# Patient Record
Sex: FEMALE | Race: WHITE | URBAN - METROPOLITAN AREA
[De-identification: names, ages, dates, MRNs, and addresses within clinical notes are randomized per-mention and may not be internally consistent; named-entity substitution may affect disease eponyms.]

---

## 2018-02-13 ENCOUNTER — IMPORTED ENCOUNTER (OUTPATIENT)
Dept: URBAN - METROPOLITAN AREA CLINIC 50 | Facility: CLINIC | Age: 78
End: 2018-02-13

## 2018-04-17 ENCOUNTER — IMPORTED ENCOUNTER (OUTPATIENT)
Dept: URBAN - METROPOLITAN AREA CLINIC 50 | Facility: CLINIC | Age: 78
End: 2018-04-17

## 2018-04-18 ENCOUNTER — IMPORTED ENCOUNTER (OUTPATIENT)
Dept: URBAN - METROPOLITAN AREA CLINIC 50 | Facility: CLINIC | Age: 78
End: 2018-04-18

## 2018-04-25 ENCOUNTER — IMPORTED ENCOUNTER (OUTPATIENT)
Dept: URBAN - METROPOLITAN AREA CLINIC 50 | Facility: CLINIC | Age: 78
End: 2018-04-25

## 2018-05-08 ENCOUNTER — IMPORTED ENCOUNTER (OUTPATIENT)
Dept: URBAN - METROPOLITAN AREA CLINIC 50 | Facility: CLINIC | Age: 78
End: 2018-05-08

## 2018-05-25 ENCOUNTER — IMPORTED ENCOUNTER (OUTPATIENT)
Dept: URBAN - METROPOLITAN AREA CLINIC 50 | Facility: CLINIC | Age: 78
End: 2018-05-25

## 2018-07-23 ENCOUNTER — IMPORTED ENCOUNTER (OUTPATIENT)
Dept: URBAN - METROPOLITAN AREA CLINIC 50 | Facility: CLINIC | Age: 78
End: 2018-07-23

## 2018-07-24 ENCOUNTER — IMPORTED ENCOUNTER (OUTPATIENT)
Dept: URBAN - METROPOLITAN AREA CLINIC 50 | Facility: CLINIC | Age: 78
End: 2018-07-24

## 2018-11-05 ENCOUNTER — IMPORTED ENCOUNTER (OUTPATIENT)
Dept: URBAN - METROPOLITAN AREA CLINIC 50 | Facility: CLINIC | Age: 78
End: 2018-11-05

## 2019-08-26 ENCOUNTER — IMPORTED ENCOUNTER (OUTPATIENT)
Dept: URBAN - METROPOLITAN AREA CLINIC 50 | Facility: CLINIC | Age: 79
End: 2019-08-26

## 2019-09-24 ENCOUNTER — IMPORTED ENCOUNTER (OUTPATIENT)
Dept: URBAN - METROPOLITAN AREA CLINIC 50 | Facility: CLINIC | Age: 79
End: 2019-09-24

## 2019-11-27 ENCOUNTER — APPOINTMENT (RX ONLY)
Dept: URBAN - METROPOLITAN AREA CLINIC 56 | Facility: CLINIC | Age: 79
Setting detail: DERMATOLOGY
End: 2019-11-27

## 2019-11-27 DIAGNOSIS — D17 BENIGN LIPOMATOUS NEOPLASM: ICD-10-CM

## 2019-11-27 PROBLEM — D17.0 BENIGN LIPOMATOUS NEOPLASM OF SKIN AND SUBCUTANEOUS TISSUE OF HEAD, FACE AND NECK: Status: ACTIVE | Noted: 2019-11-27

## 2019-11-27 PROCEDURE — ? FULL BODY SKIN EXAM - DECLINED

## 2019-11-27 PROCEDURE — 99202 OFFICE O/P NEW SF 15 MIN: CPT

## 2019-11-27 PROCEDURE — ? COUNSELING

## 2019-11-27 ASSESSMENT — LOCATION DETAILED DESCRIPTION DERM: LOCATION DETAILED: RIGHT SUPERIOR FOREHEAD

## 2019-11-27 ASSESSMENT — LOCATION ZONE DERM: LOCATION ZONE: FACE

## 2019-11-27 ASSESSMENT — LOCATION SIMPLE DESCRIPTION DERM: LOCATION SIMPLE: RIGHT FOREHEAD

## 2019-11-27 NOTE — PROCEDURE: MIPS QUALITY
Quality 431: Preventive Care And Screening: Unhealthy Alcohol Use - Screening: Patient screened for unhealthy alcohol use using a single question and scores less than 2 times per year
Quality 226: Preventive Care And Screening: Tobacco Use: Screening And Cessation Intervention: Patient screened for tobacco use and is an ex/non-smoker
Quality 47: Advance Care Plan: Advance Care Planning discussed and documented in the medical record; patient did not wish or was not able to name a surrogate decision maker or provide an advance care plan.
Detail Level: Detailed
Quality 130: Documentation Of Current Medications In The Medical Record: Current Medications Documented
Neuro

## 2019-11-27 NOTE — HPI: SUBCUTANEOUS GROWTH
How Severe Is Your Growth?: moderate
Has Your Growth Been Treated?: not been treated
Is This A New Presentation, Or A Follow-Up?: Subcutaneous Growth
Additional History: Pt states lesion was concave originally and started to grow outwards about 18 months ago.

## 2020-01-06 ENCOUNTER — IMPORTED ENCOUNTER (OUTPATIENT)
Dept: URBAN - METROPOLITAN AREA CLINIC 50 | Facility: CLINIC | Age: 80
End: 2020-01-06

## 2020-01-28 ENCOUNTER — IMPORTED ENCOUNTER (OUTPATIENT)
Dept: URBAN - METROPOLITAN AREA CLINIC 50 | Facility: CLINIC | Age: 80
End: 2020-01-28

## 2020-06-09 ENCOUNTER — IMPORTED ENCOUNTER (OUTPATIENT)
Dept: URBAN - METROPOLITAN AREA CLINIC 50 | Facility: CLINIC | Age: 80
End: 2020-06-09

## 2020-06-23 ENCOUNTER — IMPORTED ENCOUNTER (OUTPATIENT)
Dept: URBAN - METROPOLITAN AREA CLINIC 50 | Facility: CLINIC | Age: 80
End: 2020-06-23

## 2020-07-15 ENCOUNTER — IMPORTED ENCOUNTER (OUTPATIENT)
Dept: URBAN - METROPOLITAN AREA CLINIC 50 | Facility: CLINIC | Age: 80
End: 2020-07-15

## 2020-07-23 ENCOUNTER — IMPORTED ENCOUNTER (OUTPATIENT)
Dept: URBAN - METROPOLITAN AREA CLINIC 50 | Facility: CLINIC | Age: 80
End: 2020-07-23

## 2020-09-02 ENCOUNTER — IMPORTED ENCOUNTER (OUTPATIENT)
Dept: URBAN - METROPOLITAN AREA CLINIC 50 | Facility: CLINIC | Age: 80
End: 2020-09-02

## 2020-09-15 NOTE — PATIENT DISCUSSION
[place] Visual Field 36 point screen: I have reviewed the visual fields both taped and untaped on this patient which demonstrate significant obstruction of the patient's peripheral visual field on both eyes.

## 2020-09-22 ENCOUNTER — IMPORTED ENCOUNTER (OUTPATIENT)
Dept: URBAN - METROPOLITAN AREA CLINIC 50 | Facility: CLINIC | Age: 80
End: 2020-09-22

## 2021-04-14 ENCOUNTER — IMPORTED ENCOUNTER (OUTPATIENT)
Dept: URBAN - METROPOLITAN AREA CLINIC 50 | Facility: CLINIC | Age: 81
End: 2021-04-14

## 2021-04-18 ASSESSMENT — VISUAL ACUITY
OS_PH: 20/50
OD_CC: J1+@ 16 IN
OS_CC: J2
OS_CC: 20/40
OS_PH: 20/60+
OS_CC: 20/25+
OD_CC: 20/25-2
OD_PH: 20/60-
OD_CC: J2
OD_CC: 20/50-2
OD_PH: 20/50-1
OS_CC: 20/200
OD_CC: 20/30
OD_CC: 20/40-
OS_CC: J2
OS_CC: 20/40-1
OS_CC: 20/50-2
OS_CC: J2
OD_CC: J3+
OD_PH: 20/40
OS_CC: 20/200
OS_CC: J3+
OD_CC: 20/25
OD_CC: 20/30-2
OD_PH: 20/25-
OD_CC: J1+
OD_CC: J2@ 13 IN
OD_CC: 20/60
OS_CC: 20/30+
OS_CC: J1+
OD_SC: 20/50
OD_CC: J2
OD_CC: J2
OD_CC: 20/100
OS_CC: J1+@ 16 IN
OS_SC: 20/30-
OS_CC: 20/30-
OD_CC: J1+
OD_CC: 20/80-1
OD_CC: 20/70
OS_CC: 20/70-1
OD_CC: J1+
OS_PH: 20/30-2
OD_CC: 20/50+1
OS_CC: J2@ 13 IN
OD_CC: 20/60
OS_CC: J1+
OS_CC: J1+
OS_CC: 20/40-
OS_CC: 20/30-
OD_CC: 20/40+
OS_PH: 20/50-1
OS_CC: 20/40+-
OS_CC: 20/40
OS_PH: 20/25

## 2021-04-18 ASSESSMENT — TONOMETRY
OS_IOP_MMHG: 16
OS_IOP_MMHG: 12
OS_IOP_MMHG: 13
OD_IOP_MMHG: 17
OS_IOP_MMHG: 14
OD_IOP_MMHG: 15
OD_IOP_MMHG: 16
OS_IOP_MMHG: 15
OS_IOP_MMHG: 13
OD_IOP_MMHG: 16
OS_IOP_MMHG: 17
OD_IOP_MMHG: 12
OD_IOP_MMHG: 12
OS_IOP_MMHG: 16
OD_IOP_MMHG: 13

## 2021-07-15 ENCOUNTER — PREPPED CHART (OUTPATIENT)
Dept: URBAN - METROPOLITAN AREA CLINIC 48 | Facility: CLINIC | Age: 81
End: 2021-07-15

## 2021-10-26 ENCOUNTER — MOTILITY CHECK (OUTPATIENT)
Dept: URBAN - METROPOLITAN AREA CLINIC 49 | Facility: CLINIC | Age: 81
End: 2021-10-26

## 2021-10-26 DIAGNOSIS — H35.373: ICD-10-CM

## 2021-10-26 DIAGNOSIS — H53.2: ICD-10-CM

## 2021-10-26 PROCEDURE — 92134 CPTRZ OPH DX IMG PST SGM RTA: CPT

## 2021-10-26 PROCEDURE — 92060 SENSORIMOTOR EXAMINATION: CPT

## 2021-10-26 RX ORDER — LOTEPREDNOL ETABONATE 5 MG/G
1 GEL OPHTHALMIC
Start: 2021-10-26

## 2021-10-26 ASSESSMENT — VISUAL ACUITY
OD_CC: 20/40
OS_CC: 20/100+-
OS_PH: 20/40

## 2021-11-23 ENCOUNTER — MOTILITY CHECK (OUTPATIENT)
Dept: URBAN - METROPOLITAN AREA CLINIC 49 | Facility: CLINIC | Age: 81
End: 2021-11-23

## 2021-11-23 DIAGNOSIS — H53.2: ICD-10-CM

## 2021-11-23 DIAGNOSIS — H52.31: ICD-10-CM

## 2021-11-23 PROCEDURE — 92060 SENSORIMOTOR EXAMINATION: CPT

## 2021-11-23 ASSESSMENT — VISUAL ACUITY
OD_CC: 20/100+1
OU_CC: J2
OS_CC: 20/70+1

## 2021-11-23 ASSESSMENT — TONOMETRY
OS_IOP_MMHG: 13
OD_IOP_MMHG: 14

## 2022-04-20 ENCOUNTER — COMPREHENSIVE EXAM (OUTPATIENT)
Dept: URBAN - METROPOLITAN AREA CLINIC 48 | Facility: CLINIC | Age: 82
End: 2022-04-20

## 2022-04-20 DIAGNOSIS — H35.373: ICD-10-CM

## 2022-04-20 DIAGNOSIS — H53.2: ICD-10-CM

## 2022-04-20 DIAGNOSIS — H52.31: ICD-10-CM

## 2022-04-20 DIAGNOSIS — H16.223: ICD-10-CM

## 2022-04-20 PROCEDURE — 92014 COMPRE OPH EXAM EST PT 1/>: CPT

## 2022-04-20 PROCEDURE — 92134 CPTRZ OPH DX IMG PST SGM RTA: CPT

## 2022-04-20 RX ORDER — OFLOXACIN 3 MG/ML: 1 SOLUTION OPHTHALMIC TWICE A DAY

## 2022-04-20 ASSESSMENT — VISUAL ACUITY
OD_PH: 20/60-1
OS_PH: 20/60-1
OS_SC: 20/100+1
OD_SC: 20/400
OU_SC: J5

## 2022-04-20 ASSESSMENT — TONOMETRY
OS_IOP_MMHG: 14
OD_IOP_MMHG: 14

## 2022-05-11 ENCOUNTER — FOLLOW UP (OUTPATIENT)
Dept: URBAN - METROPOLITAN AREA CLINIC 48 | Facility: CLINIC | Age: 82
End: 2022-05-11

## 2022-05-11 DIAGNOSIS — H16.223: ICD-10-CM

## 2022-05-11 PROCEDURE — 6876150 PUNCTUM PLUG, BILATERAL

## 2022-05-11 PROCEDURE — 92012 INTRM OPH EXAM EST PATIENT: CPT

## 2022-05-11 ASSESSMENT — TONOMETRY
OD_IOP_MMHG: 13
OS_IOP_MMHG: 13

## 2022-05-11 ASSESSMENT — VISUAL ACUITY
OD_SC: 20/50
OS_SC: 20/100-1
OS_CC: 20/70
OD_CC: 20/70+1

## 2022-08-04 ENCOUNTER — FOLLOW UP (OUTPATIENT)
Dept: URBAN - METROPOLITAN AREA CLINIC 48 | Facility: LOCATION | Age: 82
End: 2022-08-04

## 2022-08-04 DIAGNOSIS — H16.223: ICD-10-CM

## 2022-08-04 DIAGNOSIS — H52.4: ICD-10-CM

## 2022-08-04 PROCEDURE — 92015 DETERMINE REFRACTIVE STATE: CPT

## 2022-08-04 PROCEDURE — 92012 INTRM OPH EXAM EST PATIENT: CPT

## 2022-08-04 ASSESSMENT — VISUAL ACUITY
OD_SC: 20/100
OS_SC: 20/80-2
OS_PH: 20/40-2
OU_SC: 20/80

## 2022-08-04 ASSESSMENT — TONOMETRY
OD_IOP_MMHG: 15
OS_IOP_MMHG: 13

## 2022-11-07 ENCOUNTER — FOLLOW UP (OUTPATIENT)
Dept: URBAN - METROPOLITAN AREA CLINIC 48 | Facility: LOCATION | Age: 82
End: 2022-11-07

## 2022-11-07 DIAGNOSIS — H16.223: ICD-10-CM

## 2022-11-07 DIAGNOSIS — H43.813: ICD-10-CM

## 2022-11-07 PROCEDURE — 92014 COMPRE OPH EXAM EST PT 1/>: CPT

## 2022-11-07 ASSESSMENT — TONOMETRY
OS_IOP_MMHG: 12
OD_IOP_MMHG: 14

## 2022-11-07 ASSESSMENT — VISUAL ACUITY
OD_PH: 20/40
OS_PH: 20/60
OD_CC: 20/50
OS_CC: 20/80-1

## 2022-12-19 ENCOUNTER — FOLLOW UP (OUTPATIENT)
Dept: URBAN - METROPOLITAN AREA CLINIC 48 | Facility: LOCATION | Age: 82
End: 2022-12-19

## 2022-12-19 PROCEDURE — 92012 INTRM OPH EXAM EST PATIENT: CPT

## 2022-12-19 ASSESSMENT — VISUAL ACUITY
OS_CC: 20/70-1
OD_PH: 20/50
OS_PH: 20/50
OD_CC: 20/60

## 2022-12-19 ASSESSMENT — TONOMETRY
OD_IOP_MMHG: 14
OS_IOP_MMHG: 13

## 2024-04-30 ENCOUNTER — COMPREHENSIVE EXAM (OUTPATIENT)
Dept: URBAN - METROPOLITAN AREA CLINIC 48 | Facility: LOCATION | Age: 84
End: 2024-04-30

## 2024-04-30 DIAGNOSIS — H52.4: ICD-10-CM

## 2024-04-30 DIAGNOSIS — H35.343: ICD-10-CM

## 2024-04-30 DIAGNOSIS — H35.371: ICD-10-CM

## 2024-04-30 DIAGNOSIS — H16.223: ICD-10-CM

## 2024-04-30 DIAGNOSIS — H43.811: ICD-10-CM

## 2024-04-30 DIAGNOSIS — H53.2: ICD-10-CM

## 2024-04-30 DIAGNOSIS — H52.31: ICD-10-CM

## 2024-04-30 DIAGNOSIS — H18.591: ICD-10-CM

## 2024-04-30 PROCEDURE — 99214 OFFICE O/P EST MOD 30 MIN: CPT

## 2024-04-30 PROCEDURE — 92015 DETERMINE REFRACTIVE STATE: CPT

## 2024-04-30 PROCEDURE — 92134 CPTRZ OPH DX IMG PST SGM RTA: CPT

## 2024-04-30 RX ORDER — MINERAL OIL, PETROLATUM 425; 573 MG/G; MG/G: 1 OINTMENT OPHTHALMIC EVERY EVENING

## 2024-04-30 ASSESSMENT — KERATOMETRY
OD_AXISANGLE_DEGREES: 175
OD_AXISANGLE2_DEGREES: 85
OS_K2POWER_DIOPTERS: 40.50
OD_K1POWER_DIOPTERS: 45.50
OD_K2POWER_DIOPTERS: 41.25
OS_AXISANGLE_DEGREES: 180
OS_K1POWER_DIOPTERS: 46.50
OS_AXISANGLE2_DEGREES: 90

## 2024-04-30 ASSESSMENT — TONOMETRY
OD_IOP_MMHG: 13
OS_IOP_MMHG: 13

## 2024-04-30 ASSESSMENT — VISUAL ACUITY
OU_CC: J2
OD_CC: 20/30-2
OS_CC: 20/50
OU_CC: 20/30-2

## 2024-12-06 ENCOUNTER — CONTACT LENSES/GLASSES VISIT (OUTPATIENT)
Age: 84
End: 2024-12-06

## 2024-12-06 DIAGNOSIS — H53.2: ICD-10-CM

## 2024-12-06 PROCEDURE — 92015GRNC REFRACTION GLASSES RECHECK NO CHARGE

## 2024-12-20 ENCOUNTER — CONTACT LENSES/GLASSES VISIT (OUTPATIENT)
Age: 84
End: 2024-12-20

## 2024-12-20 DIAGNOSIS — H53.2: ICD-10-CM

## 2024-12-20 DIAGNOSIS — H52.4: ICD-10-CM

## 2024-12-20 DIAGNOSIS — H35.373: ICD-10-CM

## 2024-12-20 PROCEDURE — 92134 CPTRZ OPH DX IMG PST SGM RTA: CPT | Mod: NC

## 2024-12-20 PROCEDURE — 92015GRNC REFRACTION GLASSES RECHECK NO CHARGE: Mod: NC

## 2025-02-26 ENCOUNTER — FOLLOW UP (OUTPATIENT)
Age: 85
End: 2025-02-26

## 2025-02-26 DIAGNOSIS — H43.813: ICD-10-CM

## 2025-02-26 DIAGNOSIS — H35.373: ICD-10-CM

## 2025-02-26 DIAGNOSIS — H18.591: ICD-10-CM

## 2025-02-26 DIAGNOSIS — H35.343: ICD-10-CM

## 2025-02-26 PROCEDURE — 92134 CPTRZ OPH DX IMG PST SGM RTA: CPT

## 2025-02-26 PROCEDURE — 99214 OFFICE O/P EST MOD 30 MIN: CPT
